# Patient Record
Sex: MALE | Race: WHITE | NOT HISPANIC OR LATINO | Employment: OTHER | URBAN - METROPOLITAN AREA
[De-identification: names, ages, dates, MRNs, and addresses within clinical notes are randomized per-mention and may not be internally consistent; named-entity substitution may affect disease eponyms.]

---

## 2017-08-21 ENCOUNTER — HOSPITAL ENCOUNTER (OUTPATIENT)
Dept: LAB | Facility: HOSPITAL | Age: 67
Discharge: HOME | End: 2017-08-21
Attending: INTERNAL MEDICINE | Admitting: INTERNAL MEDICINE

## 2017-08-21 LAB — HBV SURFACE AG SERPL QL IA: NONREACTIVE

## 2019-01-01 ENCOUNTER — HOSPITAL ENCOUNTER (EMERGENCY)
Facility: HOSPITAL | Age: 69
Discharge: ANOTHER HOSPITAL | End: 2019-07-06
Admitting: EMERGENCY MEDICINE

## 2019-01-01 ENCOUNTER — DOCUMENTATION (OUTPATIENT)
Dept: CARDIAC REHAB | Facility: HOSPITAL | Age: 69
End: 2019-01-01

## 2019-01-01 ENCOUNTER — APPOINTMENT (OUTPATIENT)
Dept: CT IMAGING | Facility: HOSPITAL | Age: 69
End: 2019-01-01

## 2019-01-01 ENCOUNTER — HOSPITAL ENCOUNTER (OUTPATIENT)
Dept: CARDIOLOGY | Facility: HOSPITAL | Age: 69
Discharge: HOME | End: 2019-03-26
Attending: INTERNAL MEDICINE | Admitting: INTERNAL MEDICINE

## 2019-01-01 ENCOUNTER — APPOINTMENT (OUTPATIENT)
Dept: GENERAL RADIOLOGY | Facility: HOSPITAL | Age: 69
End: 2019-01-01

## 2019-01-01 ENCOUNTER — HOSPITAL ENCOUNTER (OUTPATIENT)
Dept: PREADMISSION TESTING | Facility: HOSPITAL | Age: 69
Discharge: HOME | End: 2019-04-08
Attending: INTERNAL MEDICINE | Admitting: INTERNAL MEDICINE

## 2019-01-01 ENCOUNTER — HOSPITAL ENCOUNTER (OUTPATIENT)
Dept: PREADMISSION TESTING | Facility: HOSPITAL | Age: 69
Discharge: HOME | End: 2019-04-26
Attending: INTERNAL MEDICINE | Admitting: INTERNAL MEDICINE

## 2019-01-01 ENCOUNTER — ANTICOAGULATION VISIT (OUTPATIENT)
Dept: CARDIOLOGY | Facility: CLINIC | Age: 69
End: 2019-01-01

## 2019-01-01 ENCOUNTER — HOSPITAL ENCOUNTER (INPATIENT)
Facility: HOSPITAL | Age: 69
LOS: 1 days | End: 2019-07-06
Attending: INTERNAL MEDICINE | Admitting: INTERNAL MEDICINE

## 2019-01-01 VITALS
HEART RATE: 75 BPM | OXYGEN SATURATION: 98 % | BODY MASS INDEX: 33.58 KG/M2 | RESPIRATION RATE: 18 BRPM | SYSTOLIC BLOOD PRESSURE: 141 MMHG | TEMPERATURE: 98.1 F | DIASTOLIC BLOOD PRESSURE: 79 MMHG | HEIGHT: 71 IN | WEIGHT: 239.86 LBS

## 2019-01-01 VITALS
HEART RATE: 122 BPM | WEIGHT: 239.86 LBS | DIASTOLIC BLOOD PRESSURE: 61 MMHG | OXYGEN SATURATION: 96 % | BODY MASS INDEX: 33.58 KG/M2 | TEMPERATURE: 98.2 F | RESPIRATION RATE: 34 BRPM | HEIGHT: 71 IN | SYSTOLIC BLOOD PRESSURE: 102 MMHG

## 2019-01-01 DIAGNOSIS — N18.6 ESRD (END STAGE RENAL DISEASE) (HCC): ICD-10-CM

## 2019-01-01 DIAGNOSIS — I60.9 SUBARACHNOID HEMORRHAGE (HCC): Primary | ICD-10-CM

## 2019-01-01 DIAGNOSIS — S40.011A CONTUSION OF MULTIPLE SITES OF RIGHT SHOULDER, INITIAL ENCOUNTER: ICD-10-CM

## 2019-01-01 DIAGNOSIS — R55 SYNCOPE, UNSPECIFIED SYNCOPE TYPE: ICD-10-CM

## 2019-01-01 DIAGNOSIS — S80.02XA CONTUSION OF LEFT KNEE, INITIAL ENCOUNTER: ICD-10-CM

## 2019-01-01 DIAGNOSIS — S00.01XA ABRASION OF SCALP, INITIAL ENCOUNTER: ICD-10-CM

## 2019-01-01 DIAGNOSIS — I48.91 ATRIAL FIBRILLATION, UNSPECIFIED TYPE (HCC): Primary | ICD-10-CM

## 2019-01-01 LAB
ABO + RH BLD: NORMAL
ABO GROUP BLD: NORMAL
ALBUMIN SERPL-MCNC: 3.5 G/DL (ref 3.5–5.2)
ALBUMIN SERPL-MCNC: 3.5 G/DL (ref 3.5–5.2)
ALBUMIN/GLOB SERPL: 1.1 G/DL
ALP SERPL-CCNC: 104 U/L (ref 39–117)
ALT SERPL W P-5'-P-CCNC: 10 U/L (ref 1–41)
ANION GAP SERPL CALC-SCNC: 17.6 MMOL/L (ref 10–20)
ANION GAP SERPL CALC-SCNC: 19.6 MMOL/L (ref 10–20)
ANION GAP SERPL CALCULATED.3IONS-SCNC: 18.8 MMOL/L (ref 5–15)
ANION GAP SERPL CALCULATED.3IONS-SCNC: 18.8 MMOL/L (ref 5–15)
ANION GAP SERPL CALCULATED.3IONS-SCNC: 19.5 MMOL/L (ref 10–20)
ANISOCYTOSIS BLD QL: ABNORMAL
ARTERIAL PATENCY WRIST A: POSITIVE
AST SERPL-CCNC: 17 U/L (ref 1–40)
ATMOSPHERIC PRESS: 750.9 MMHG
BASE EXCESS BLDA CALC-SCNC: -7.8 MMOL/L (ref 0–2)
BASOPHILS # BLD AUTO: 0.1 10*3/MM3 (ref 0–0.2)
BASOPHILS # BLD AUTO: 0.1 10*3/UL (ref 0–0.2)
BASOPHILS # BLD AUTO: 0.1 10*3/UL (ref 0–0.2)
BASOPHILS NFR BLD AUTO: 0.9 % (ref 0–1.5)
BASOPHILS NFR BLD AUTO: 1 % (ref 0–2)
BASOPHILS NFR BLD AUTO: 1 % (ref 0–2)
BDY SITE: ABNORMAL
BH BB BLOOD EXPIRATION DATE: NORMAL
BH BB BLOOD TYPE BARCODE: 7300
BH BB DISPENSE STATUS: NORMAL
BH BB PRODUCT CODE: NORMAL
BH BB UNIT NUMBER: NORMAL
BILIRUB SERPL-MCNC: 0.5 MG/DL (ref 0.2–1.2)
BNP SERPL-MCNC: 112 PG/ML
BUN BLD-MCNC: 19 MG/DL (ref 8–20)
BUN BLD-MCNC: 26 MG/DL (ref 8–23)
BUN BLD-MCNC: 26 MG/DL (ref 8–23)
BUN SERPL-MCNC: 14 MG/DL (ref 8–20)
BUN SERPL-MCNC: 8 MG/DL (ref 8–20)
BUN/CREAT SERPL: 1.9 (ref 6.2–20.3)
BUN/CREAT SERPL: 2.7 (ref 6.2–20.3)
BUN/CREAT SERPL: 2.9 (ref 6.2–20.3)
BUN/CREAT SERPL: 4.1 (ref 7–25)
BUN/CREAT SERPL: 4.1 (ref 7–25)
BURR CELLS BLD QL SMEAR: ABNORMAL
CALCIUM SERPL-MCNC: 9.6 MG/DL (ref 8.9–10.3)
CALCIUM SERPL-MCNC: 9.9 MG/DL (ref 8.9–10.3)
CALCIUM SPEC-SCNC: 9.1 MG/DL (ref 8.9–10.3)
CALCIUM SPEC-SCNC: 9.7 MG/DL (ref 8.6–10.5)
CALCIUM SPEC-SCNC: 9.7 MG/DL (ref 8.6–10.5)
CHLORIDE SERPL-SCNC: 103 MMOL/L (ref 98–107)
CHLORIDE SERPL-SCNC: 103 MMOL/L (ref 98–107)
CHLORIDE SERPL-SCNC: 97 MMOL/L (ref 101–111)
CHLORIDE SERPL-SCNC: 98 MMOL/L (ref 101–111)
CHLORIDE SERPL-SCNC: 98 MMOL/L (ref 101–111)
CO2 SERPL-SCNC: 18.2 MMOL/L (ref 22–29)
CO2 SERPL-SCNC: 18.2 MMOL/L (ref 22–29)
CO2 SERPL-SCNC: 21 MMOL/L (ref 22–32)
CONV CO2: 23 MMOL/L (ref 22–32)
CONV CO2: 24 MMOL/L (ref 22–32)
CREAT BLD-MCNC: 6.34 MG/DL (ref 0.76–1.27)
CREAT BLD-MCNC: 6.34 MG/DL (ref 0.76–1.27)
CREAT BLD-MCNC: 6.6 MG/DL (ref 0.7–1.2)
CREAT UR-MCNC: 4.2 MG/DL (ref 0.7–1.2)
CREAT UR-MCNC: 5.1 MG/DL (ref 0.7–1.2)
DEPRECATED RDW RBC AUTO: 56 FL (ref 37–54)
DEPRECATED RDW RBC AUTO: 60.2 FL (ref 37–54)
DIFFERENTIAL METHOD BLD: (no result)
DIFFERENTIAL METHOD BLD: (no result)
EOSINOPHIL # BLD AUTO: 0.2 10*3/MM3 (ref 0–0.4)
EOSINOPHIL # BLD AUTO: 0.3 10*3/UL (ref 0–0.3)
EOSINOPHIL # BLD AUTO: 0.4 10*3/UL (ref 0–0.3)
EOSINOPHIL # BLD AUTO: 3 % (ref 0–3)
EOSINOPHIL # BLD AUTO: 4 % (ref 0–3)
EOSINOPHIL # BLD MANUAL: 0.11 10*3/MM3 (ref 0–0.4)
EOSINOPHIL NFR BLD AUTO: 2.1 % (ref 0.3–6.2)
EOSINOPHIL NFR BLD MANUAL: 1 % (ref 0.3–6.2)
ERYTHROCYTE [DISTWIDTH] IN BLOOD BY AUTOMATED COUNT: 15.3 % (ref 11.5–14.5)
ERYTHROCYTE [DISTWIDTH] IN BLOOD BY AUTOMATED COUNT: 15.6 % (ref 12.3–15.4)
ERYTHROCYTE [DISTWIDTH] IN BLOOD BY AUTOMATED COUNT: 16.4 % (ref 11.5–14.5)
ERYTHROCYTE [DISTWIDTH] IN BLOOD BY AUTOMATED COUNT: 16.6 % (ref 12.3–15.4)
GFR SERPL CREATININE-BSD FRML MDRD: 8 ML/MIN/1.73
GFR SERPL CREATININE-BSD FRML MDRD: 9 ML/MIN/1.73
GFR SERPL CREATININE-BSD FRML MDRD: 9 ML/MIN/1.73
GFR SERPL CREATININE-BSD FRML MDRD: ABNORMAL ML/MIN/1.73
GLOBULIN UR ELPH-MCNC: 3.3 GM/DL
GLUCOSE BLD-MCNC: 146 MG/DL (ref 65–99)
GLUCOSE BLD-MCNC: 146 MG/DL (ref 65–99)
GLUCOSE BLD-MCNC: 151 MG/DL (ref 65–99)
GLUCOSE BLDC GLUCOMTR-MCNC: 110 MG/DL (ref 70–130)
GLUCOSE BLDC GLUCOMTR-MCNC: 112 MG/DL (ref 70–130)
GLUCOSE SERPL-MCNC: 106 MG/DL (ref 65–99)
GLUCOSE SERPL-MCNC: 90 MG/DL (ref 65–99)
HCO3 BLDA-SCNC: 20.3 MMOL/L (ref 22–28)
HCT VFR BLD AUTO: 39.2 % (ref 40–54)
HCT VFR BLD AUTO: 40.2 % (ref 37.5–51)
HCT VFR BLD AUTO: 40.7 % (ref 40–54)
HCT VFR BLD AUTO: 46.1 % (ref 37.5–51)
HGB BLD-MCNC: 12.6 G/DL (ref 14–18)
HGB BLD-MCNC: 12.9 G/DL (ref 13–17.7)
HGB BLD-MCNC: 13.3 G/DL (ref 13–17.7)
HGB BLD-MCNC: 13.3 G/DL (ref 14–18)
HOROWITZ INDEX BLD+IHG-RTO: 100 %
INR PPP: 1 (ref 2–3)
INR PPP: 1.4 (ref 2–3)
INR PPP: 2.31 (ref 2–3)
INR PPP: 3.4 (ref 0.9–1.1)
LYMPHOCYTES # BLD AUTO: 1.5 10*3/UL (ref 0.8–4.8)
LYMPHOCYTES # BLD AUTO: 1.5 10*3/UL (ref 0.8–4.8)
LYMPHOCYTES # BLD AUTO: 1.7 10*3/MM3 (ref 0.7–3.1)
LYMPHOCYTES # BLD MANUAL: 6.05 10*3/MM3 (ref 0.7–3.1)
LYMPHOCYTES NFR BLD AUTO: 14.4 % (ref 19.6–45.3)
LYMPHOCYTES NFR BLD AUTO: 17 % (ref 18–42)
LYMPHOCYTES NFR BLD AUTO: 19 % (ref 18–42)
LYMPHOCYTES NFR BLD MANUAL: 3.1 % (ref 5–12)
LYMPHOCYTES NFR BLD MANUAL: 56.1 % (ref 19.6–45.3)
MACROCYTES BLD QL SMEAR: ABNORMAL
MCH RBC QN AUTO: 30 PG (ref 26.6–33)
MCH RBC QN AUTO: 30.6 PG (ref 26–32)
MCH RBC QN AUTO: 30.9 PG (ref 26.6–33)
MCH RBC QN AUTO: 31.4 PG (ref 26–32)
MCHC RBC AUTO-ENTMCNC: 28.9 G/DL (ref 31.5–35.7)
MCHC RBC AUTO-ENTMCNC: 32.2 G/DL (ref 31.5–35.7)
MCHC RBC AUTO-ENTMCNC: 32.2 G/DL (ref 32–36)
MCHC RBC AUTO-ENTMCNC: 32.6 G/DL (ref 32–36)
MCV RBC AUTO: 104.1 FL (ref 79–97)
MCV RBC AUTO: 95.2 FL (ref 80–94)
MCV RBC AUTO: 96.2 FL (ref 79–97)
MCV RBC AUTO: 96.4 FL (ref 80–94)
METAMYELOCYTES NFR BLD MANUAL: 1 % (ref 0–0)
MODALITY: ABNORMAL
MONOCYTES # BLD AUTO: 0.33 10*3/MM3 (ref 0.1–0.9)
MONOCYTES # BLD AUTO: 0.7 10*3/UL (ref 0.1–1.3)
MONOCYTES # BLD AUTO: 0.8 10*3/UL (ref 0.1–1.3)
MONOCYTES # BLD AUTO: 1 10*3/MM3 (ref 0.1–0.9)
MONOCYTES NFR BLD AUTO: 10 % (ref 2–11)
MONOCYTES NFR BLD AUTO: 8.5 % (ref 5–12)
MONOCYTES NFR BLD AUTO: 9 % (ref 2–11)
NEUTROPHILS # BLD AUTO: 4.18 10*3/MM3 (ref 1.7–7)
NEUTROPHILS # BLD AUTO: 5.2 10*3/UL (ref 2.3–8.6)
NEUTROPHILS # BLD AUTO: 6.1 10*3/UL (ref 2.3–8.6)
NEUTROPHILS # BLD AUTO: 8.7 10*3/MM3 (ref 1.7–7)
NEUTROPHILS NFR BLD AUTO: 67 % (ref 50–75)
NEUTROPHILS NFR BLD AUTO: 69 % (ref 50–75)
NEUTROPHILS NFR BLD AUTO: 74.1 % (ref 42.7–76)
NEUTROPHILS NFR BLD MANUAL: 38.8 % (ref 42.7–76)
NRBC BLD AUTO-RTO: 0 /100 WBC (ref 0–0.2)
NRBC BLD AUTO-RTO: 0 /100{WBCS}
NRBC BLD AUTO-RTO: 0 /100{WBCS}
NRBC SPEC MANUAL: 2 /100 WBC (ref 0–0.2)
NRBC/RBC NFR BLD MANUAL: 0 10*3/UL
NRBC/RBC NFR BLD MANUAL: 0 10*3/UL
O2 A-A PPRESDIFF RESPIRATORY: 0.3 MMHG
PCO2 BLDA: 49.9 MM HG (ref 35–45)
PEEP RESPIRATORY: 5 CM[H2O]
PH BLDA: 7.22 PH UNITS (ref 7.35–7.45)
PHOSPHATE SERPL-MCNC: 3.8 MG/DL (ref 2.5–4.5)
PLATELET # BLD AUTO: 102 10*3/MM3 (ref 140–450)
PLATELET # BLD AUTO: 119 10*3/MM3 (ref 140–450)
PLATELET # BLD AUTO: 122 10*3/UL (ref 150–450)
PLATELET # BLD AUTO: 127 10*3/UL (ref 150–450)
PMV BLD AUTO: 10.5 FL (ref 6–12)
PMV BLD AUTO: 8.4 FL (ref 7.4–10.4)
PMV BLD AUTO: 8.8 FL (ref 6–12)
PMV BLD AUTO: 8.8 FL (ref 7.4–10.4)
PO2 BLDA: 217.8 MM HG (ref 80–100)
POIKILOCYTOSIS BLD QL SMEAR: ABNORMAL
POLYCHROMASIA BLD QL SMEAR: ABNORMAL
POTASSIUM BLD-SCNC: 4.2 MMOL/L (ref 3.5–5.2)
POTASSIUM BLD-SCNC: 4.2 MMOL/L (ref 3.5–5.2)
POTASSIUM BLD-SCNC: 4.5 MMOL/L (ref 3.6–5.1)
POTASSIUM SERPL-SCNC: 3.6 MMOL/L (ref 3.6–5.1)
POTASSIUM SERPL-SCNC: 3.6 MMOL/L (ref 3.6–5.1)
PROT SERPL-MCNC: 6.8 G/DL (ref 6–8.5)
PROTHROMBIN TIME: 10.3 SEC (ref 19.4–28.5)
PROTHROMBIN TIME: 22.3 SECONDS (ref 19.4–28.5)
PROTHROMBIN TIME: ABNORMAL SEC (ref 19.4–28.5)
RBC # BLD AUTO: 4.12 10*6/UL (ref 4.6–6)
RBC # BLD AUTO: 4.18 10*6/MM3 (ref 4.14–5.8)
RBC # BLD AUTO: 4.22 10*6/UL (ref 4.6–6)
RBC # BLD AUTO: 4.43 10*6/MM3 (ref 4.14–5.8)
RH BLD: POSITIVE
SAO2 % BLDCOA: 99.6 % (ref 92–99)
SET MECH RESP RATE: 16
SMALL PLATELETS BLD QL SMEAR: ABNORMAL
SODIUM BLD-SCNC: 134 MMOL/L (ref 136–144)
SODIUM BLD-SCNC: 140 MMOL/L (ref 136–145)
SODIUM BLD-SCNC: 140 MMOL/L (ref 136–145)
SODIUM SERPL-SCNC: 136 MMOL/L (ref 136–144)
SODIUM SERPL-SCNC: 136 MMOL/L (ref 136–144)
TOTAL RATE: 32 BREATHS/MINUTE
TROPONIN I SERPL-MCNC: <0.03 NG/ML (ref 0–0.03)
TROPONIN T SERPL-MCNC: <0.01 NG/ML (ref 0–0.03)
UNIT  ABO: NORMAL
UNIT  RH: NORMAL
VENTILATOR MODE: ABNORMAL
VT ON VENT VENT: 500 ML
WBC # BLD AUTO: 7.7 10*3/UL (ref 4.5–11.5)
WBC # BLD AUTO: 8.8 10*3/UL (ref 4.5–11.5)
WBC MORPH BLD: NORMAL
WBC NRBC COR # BLD: 10.78 10*3/MM3 (ref 3.4–10.8)
WBC NRBC COR # BLD: 11.7 10*3/MM3 (ref 3.4–10.8)

## 2019-01-01 PROCEDURE — 94002 VENT MGMT INPAT INIT DAY: CPT

## 2019-01-01 PROCEDURE — 93005 ELECTROCARDIOGRAM TRACING: CPT | Performed by: NURSE PRACTITIONER

## 2019-01-01 PROCEDURE — 93005 ELECTROCARDIOGRAM TRACING: CPT | Performed by: INTERNAL MEDICINE

## 2019-01-01 PROCEDURE — 82803 BLOOD GASES ANY COMBINATION: CPT

## 2019-01-01 PROCEDURE — 36416 COLLJ CAPILLARY BLOOD SPEC: CPT | Performed by: INTERNAL MEDICINE

## 2019-01-01 PROCEDURE — 86901 BLOOD TYPING SEROLOGIC RH(D): CPT | Performed by: NURSE PRACTITIONER

## 2019-01-01 PROCEDURE — 94799 UNLISTED PULMONARY SVC/PX: CPT

## 2019-01-01 PROCEDURE — 25010000002 LORAZEPAM PER 2 MG

## 2019-01-01 PROCEDURE — 71045 X-RAY EXAM CHEST 1 VIEW: CPT

## 2019-01-01 PROCEDURE — 25010000002 ATROPINE PER 0.01 MG: Performed by: INTERNAL MEDICINE

## 2019-01-01 PROCEDURE — B548ZZA ULTRASONOGRAPHY OF SUPERIOR VENA CAVA, GUIDANCE: ICD-10-PCS | Performed by: INTERNAL MEDICINE

## 2019-01-01 PROCEDURE — 85025 COMPLETE CBC W/AUTO DIFF WBC: CPT | Performed by: INTERNAL MEDICINE

## 2019-01-01 PROCEDURE — 25010000002 EPINEPHRINE PF 1 MG/10ML SOLUTION PREFILLED SYRINGE: Performed by: INTERNAL MEDICINE

## 2019-01-01 PROCEDURE — 80048 BASIC METABOLIC PNL TOTAL CA: CPT | Performed by: NURSE PRACTITIONER

## 2019-01-01 PROCEDURE — P9017 PLASMA 1 DONOR FRZ W/IN 8 HR: HCPCS

## 2019-01-01 PROCEDURE — 25010000002 MAGNESIUM SULFATE PER 500 MG OF MAGNESIUM: Performed by: INTERNAL MEDICINE

## 2019-01-01 PROCEDURE — 80053 COMPREHEN METABOLIC PANEL: CPT | Performed by: INTERNAL MEDICINE

## 2019-01-01 PROCEDURE — 0BH17EZ INSERTION OF ENDOTRACHEAL AIRWAY INTO TRACHEA, VIA NATURAL OR ARTIFICIAL OPENING: ICD-10-PCS | Performed by: INTERNAL MEDICINE

## 2019-01-01 PROCEDURE — 5A1935Z RESPIRATORY VENTILATION, LESS THAN 24 CONSECUTIVE HOURS: ICD-10-PCS | Performed by: INTERNAL MEDICINE

## 2019-01-01 PROCEDURE — 73030 X-RAY EXAM OF SHOULDER: CPT

## 2019-01-01 PROCEDURE — 85025 COMPLETE CBC W/AUTO DIFF WBC: CPT | Performed by: NURSE PRACTITIONER

## 2019-01-01 PROCEDURE — 25010000002 PHENYLEPHRINE 10 MG/ML SOLUTION 5 ML VIAL: Performed by: INTERNAL MEDICINE

## 2019-01-01 PROCEDURE — 84484 ASSAY OF TROPONIN QUANT: CPT | Performed by: NURSE PRACTITIONER

## 2019-01-01 PROCEDURE — 0W9B30Z DRAINAGE OF LEFT PLEURAL CAVITY WITH DRAINAGE DEVICE, PERCUTANEOUS APPROACH: ICD-10-PCS | Performed by: INTERNAL MEDICINE

## 2019-01-01 PROCEDURE — 85610 PROTHROMBIN TIME: CPT | Performed by: INTERNAL MEDICINE

## 2019-01-01 PROCEDURE — 86927 PLASMA FRESH FROZEN: CPT

## 2019-01-01 PROCEDURE — 99285 EMERGENCY DEPT VISIT HI MDM: CPT

## 2019-01-01 PROCEDURE — 93010 ELECTROCARDIOGRAM REPORT: CPT | Performed by: INTERNAL MEDICINE

## 2019-01-01 PROCEDURE — 72125 CT NECK SPINE W/O DYE: CPT

## 2019-01-01 PROCEDURE — 36430 TRANSFUSION BLD/BLD COMPNT: CPT

## 2019-01-01 PROCEDURE — 84484 ASSAY OF TROPONIN QUANT: CPT | Performed by: INTERNAL MEDICINE

## 2019-01-01 PROCEDURE — 85007 BL SMEAR W/DIFF WBC COUNT: CPT | Performed by: INTERNAL MEDICINE

## 2019-01-01 PROCEDURE — 73560 X-RAY EXAM OF KNEE 1 OR 2: CPT

## 2019-01-01 PROCEDURE — 25010000002 PROPOFOL 10 MG/ML EMULSION

## 2019-01-01 PROCEDURE — 02HV33Z INSERTION OF INFUSION DEVICE INTO SUPERIOR VENA CAVA, PERCUTANEOUS APPROACH: ICD-10-PCS | Performed by: INTERNAL MEDICINE

## 2019-01-01 PROCEDURE — 5A12012 PERFORMANCE OF CARDIAC OUTPUT, SINGLE, MANUAL: ICD-10-PCS | Performed by: INTERNAL MEDICINE

## 2019-01-01 PROCEDURE — 80069 RENAL FUNCTION PANEL: CPT | Performed by: INTERNAL MEDICINE

## 2019-01-01 PROCEDURE — 85610 PROTHROMBIN TIME: CPT | Performed by: NURSE PRACTITIONER

## 2019-01-01 PROCEDURE — 5A2204Z RESTORATION OF CARDIAC RHYTHM, SINGLE: ICD-10-PCS | Performed by: INTERNAL MEDICINE

## 2019-01-01 PROCEDURE — 83880 ASSAY OF NATRIURETIC PEPTIDE: CPT | Performed by: NURSE PRACTITIONER

## 2019-01-01 PROCEDURE — 25010000002 VITAMIN K1 PER 1 MG: Performed by: NURSE PRACTITIONER

## 2019-01-01 PROCEDURE — 92950 HEART/LUNG RESUSCITATION CPR: CPT

## 2019-01-01 PROCEDURE — 82962 GLUCOSE BLOOD TEST: CPT

## 2019-01-01 PROCEDURE — 96365 THER/PROPH/DIAG IV INF INIT: CPT

## 2019-01-01 PROCEDURE — 70450 CT HEAD/BRAIN W/O DYE: CPT

## 2019-01-01 PROCEDURE — 86900 BLOOD TYPING SEROLOGIC ABO: CPT | Performed by: NURSE PRACTITIONER

## 2019-01-01 PROCEDURE — 36600 WITHDRAWAL OF ARTERIAL BLOOD: CPT

## 2019-01-01 RX ORDER — MAGNESIUM SULFATE HEPTAHYDRATE 500 MG/ML
INJECTION, SOLUTION INTRAMUSCULAR; INTRAVENOUS
Status: COMPLETED | OUTPATIENT
Start: 2019-01-01 | End: 2019-01-01

## 2019-01-01 RX ORDER — ATROPINE SULFATE 1 MG/ML
INJECTION, SOLUTION INTRAMUSCULAR; INTRAVENOUS; SUBCUTANEOUS
Status: COMPLETED | OUTPATIENT
Start: 2019-01-01 | End: 2019-01-01

## 2019-01-01 RX ORDER — ALBUTEROL SULFATE 90 UG/1
6 AEROSOL, METERED RESPIRATORY (INHALATION)
Status: DISCONTINUED | OUTPATIENT
Start: 2019-01-01 | End: 2019-01-01

## 2019-01-01 RX ORDER — FENTANYL CITRATE 50 UG/ML
25 INJECTION, SOLUTION INTRAMUSCULAR; INTRAVENOUS
Status: DISCONTINUED | OUTPATIENT
Start: 2019-01-01 | End: 2019-01-01 | Stop reason: HOSPADM

## 2019-01-01 RX ORDER — IPRATROPIUM BROMIDE AND ALBUTEROL SULFATE 2.5; .5 MG/3ML; MG/3ML
3 SOLUTION RESPIRATORY (INHALATION) EVERY 6 HOURS PRN
Status: DISCONTINUED | OUTPATIENT
Start: 2019-01-01 | End: 2019-01-01

## 2019-01-01 RX ORDER — CALCIUM CHLORIDE 100 MG/ML
INJECTION INTRAVENOUS; INTRAVENTRICULAR
Status: COMPLETED | OUTPATIENT
Start: 2019-01-01 | End: 2019-01-01

## 2019-01-01 RX ORDER — PROPOFOL 10 MG/ML
VIAL (ML) INTRAVENOUS
Status: COMPLETED
Start: 2019-01-01 | End: 2019-01-01

## 2019-01-01 RX ORDER — SODIUM CHLORIDE 0.9 % (FLUSH) 0.9 %
3 SYRINGE (ML) INJECTION EVERY 12 HOURS SCHEDULED
Status: DISCONTINUED | OUTPATIENT
Start: 2019-01-01 | End: 2019-01-01 | Stop reason: HOSPADM

## 2019-01-01 RX ORDER — NOREPINEPHRINE BIT/0.9 % NACL 8 MG/250ML
.02-.3 INFUSION BOTTLE (ML) INTRAVENOUS
Status: DISCONTINUED | OUTPATIENT
Start: 2019-01-01 | End: 2019-01-01 | Stop reason: HOSPADM

## 2019-01-01 RX ORDER — LORAZEPAM 2 MG/ML
INJECTION INTRAMUSCULAR
Status: COMPLETED
Start: 2019-01-01 | End: 2019-01-01

## 2019-01-01 RX ORDER — SODIUM BICARBONATE 650 MG/1
650 TABLET ORAL DAILY
Status: DISCONTINUED | OUTPATIENT
Start: 2019-01-01 | End: 2019-01-01 | Stop reason: HOSPADM

## 2019-01-01 RX ORDER — SODIUM CHLORIDE 0.9 % (FLUSH) 0.9 %
3-10 SYRINGE (ML) INJECTION AS NEEDED
Status: DISCONTINUED | OUTPATIENT
Start: 2019-01-01 | End: 2019-01-01 | Stop reason: HOSPADM

## 2019-01-01 RX ORDER — ALLOPURINOL 100 MG/1
100 TABLET ORAL DAILY
Status: DISCONTINUED | OUTPATIENT
Start: 2019-01-01 | End: 2019-01-01 | Stop reason: HOSPADM

## 2019-01-01 RX ORDER — LEVETIRACETAM 5 MG/ML
500 INJECTION INTRAVASCULAR EVERY 24 HOURS
Status: DISCONTINUED | OUTPATIENT
Start: 2019-01-01 | End: 2019-01-01 | Stop reason: HOSPADM

## 2019-01-01 RX ORDER — PANTOPRAZOLE SODIUM 40 MG/1
40 TABLET, DELAYED RELEASE ORAL EVERY MORNING
Status: DISCONTINUED | OUTPATIENT
Start: 2019-01-01 | End: 2019-01-01 | Stop reason: HOSPADM

## 2019-01-01 RX ORDER — SODIUM CHLORIDE 0.9 % (FLUSH) 0.9 %
10 SYRINGE (ML) INJECTION AS NEEDED
Status: DISCONTINUED | OUTPATIENT
Start: 2019-01-01 | End: 2019-01-01 | Stop reason: HOSPADM

## 2019-01-01 RX ORDER — ATORVASTATIN CALCIUM 20 MG/1
20 TABLET, FILM COATED ORAL DAILY
Status: DISCONTINUED | OUTPATIENT
Start: 2019-01-01 | End: 2019-01-01 | Stop reason: HOSPADM

## 2019-01-01 RX ORDER — NOREPINEPHRINE BIT/0.9 % NACL 8 MG/250ML
INFUSION BOTTLE (ML) INTRAVENOUS
Status: COMPLETED
Start: 2019-01-01 | End: 2019-01-01

## 2019-01-01 RX ADMIN — EPINEPHRINE 1 MG: 0.1 INJECTION, SOLUTION ENDOTRACHEAL; INTRACARDIAC; INTRAVENOUS at 04:51

## 2019-01-01 RX ADMIN — EPINEPHRINE 1 MG: 0.1 INJECTION, SOLUTION ENDOTRACHEAL; INTRACARDIAC; INTRAVENOUS at 05:03

## 2019-01-01 RX ADMIN — SODIUM BICARBONATE 50 MEQ: 84 INJECTION, SOLUTION INTRAVENOUS at 04:45

## 2019-01-01 RX ADMIN — PHYTONADIONE 10 MG: 10 INJECTION, EMULSION INTRAMUSCULAR; INTRAVENOUS; SUBCUTANEOUS at 23:25

## 2019-01-01 RX ADMIN — Medication 0.3 MCG/KG/MIN: at 04:06

## 2019-01-01 RX ADMIN — CALCIUM CHLORIDE 1 G: 100 INJECTION, SOLUTION INTRAVENOUS at 02:57

## 2019-01-01 RX ADMIN — CALCIUM CHLORIDE 1 G: 100 INJECTION, SOLUTION INTRAVENOUS at 04:42

## 2019-01-01 RX ADMIN — EPINEPHRINE 1 MG: 0.1 INJECTION, SOLUTION ENDOTRACHEAL; INTRACARDIAC; INTRAVENOUS at 02:55

## 2019-01-01 RX ADMIN — SODIUM CHLORIDE, PRESERVATIVE FREE 3 ML: 5 INJECTION INTRAVENOUS at 01:30

## 2019-01-01 RX ADMIN — MAGNESIUM SULFATE HEPTAHYDRATE 2 G: 500 INJECTION, SOLUTION INTRAMUSCULAR; INTRAVENOUS at 04:57

## 2019-01-01 RX ADMIN — EPINEPHRINE 1 MG: 0.1 INJECTION, SOLUTION ENDOTRACHEAL; INTRACARDIAC; INTRAVENOUS at 04:55

## 2019-01-01 RX ADMIN — EPINEPHRINE 1 MG: 0.1 INJECTION, SOLUTION ENDOTRACHEAL; INTRACARDIAC; INTRAVENOUS at 05:06

## 2019-01-01 RX ADMIN — EPINEPHRINE 1 MG: 0.1 INJECTION, SOLUTION ENDOTRACHEAL; INTRACARDIAC; INTRAVENOUS at 03:01

## 2019-01-01 RX ADMIN — PROPOFOL 20 MCG/KG/MIN: 10 INJECTION, EMULSION INTRAVENOUS at 04:04

## 2019-01-01 RX ADMIN — SODIUM BICARBONATE 50 MEQ: 84 INJECTION, SOLUTION INTRAVENOUS at 02:56

## 2019-01-01 RX ADMIN — EPINEPHRINE 1 MG: 0.1 INJECTION, SOLUTION ENDOTRACHEAL; INTRACARDIAC; INTRAVENOUS at 04:47

## 2019-01-01 RX ADMIN — EPINEPHRINE 1 MG: 0.1 INJECTION, SOLUTION ENDOTRACHEAL; INTRACARDIAC; INTRAVENOUS at 04:57

## 2019-01-01 RX ADMIN — EPINEPHRINE 1 MG: 0.1 INJECTION, SOLUTION ENDOTRACHEAL; INTRACARDIAC; INTRAVENOUS at 04:44

## 2019-01-01 RX ADMIN — EPINEPHRINE 1 MG: 0.1 INJECTION, SOLUTION ENDOTRACHEAL; INTRACARDIAC; INTRAVENOUS at 04:40

## 2019-01-01 RX ADMIN — SODIUM BICARBONATE 50 MEQ: 84 INJECTION, SOLUTION INTRAVENOUS at 04:37

## 2019-01-01 RX ADMIN — EPINEPHRINE 1 MG: 0.1 INJECTION, SOLUTION ENDOTRACHEAL; INTRACARDIAC; INTRAVENOUS at 05:00

## 2019-01-01 RX ADMIN — EPINEPHRINE 1 MG: 0.1 INJECTION, SOLUTION ENDOTRACHEAL; INTRACARDIAC; INTRAVENOUS at 02:58

## 2019-01-01 RX ADMIN — ATROPINE SULFATE 1 MG: 1 INJECTION, SOLUTION INTRAMUSCULAR; INTRAVENOUS; SUBCUTANEOUS at 04:35

## 2019-01-01 RX ADMIN — LORAZEPAM 1 MG: 2 INJECTION INTRAMUSCULAR; INTRAVENOUS at 03:21

## 2019-01-01 RX ADMIN — SODIUM BICARBONATE 50 MEQ: 84 INJECTION, SOLUTION INTRAVENOUS at 04:42

## 2019-01-01 RX ADMIN — PHENYLEPHRINE HYDROCHLORIDE 0.5 MCG/KG/MIN: 10 INJECTION INTRAVENOUS at 04:11

## 2019-06-25 NOTE — PROGRESS NOTES
06/25/2019- called pt and he sts that he is not interested in CR and is doing well and walking at home.

## 2019-06-27 NOTE — PATIENT INSTRUCTIONS
Decrease Coumadin weekly regimen to above dosing schedule.  Pt states no changes in diet, meds, alcohol intake or health status.

## 2019-07-06 PROBLEM — I60.9 SAH (SUBARACHNOID HEMORRHAGE) (HCC): Status: ACTIVE | Noted: 2019-01-01

## 2019-07-06 NOTE — NURSING NOTE
"At patient's bedside, when pt stated that he felt clammy and \"weird\". Upon examination, HR went sinus tach and pt diaphoretic. Pt is diabetic, so I went to get the acucheck machine and when I returned to the room pt was non-responsive, all muscles clinched, pupils non-reactive, stridor like breathing. I then called for assistance and pt stopped breathing, became cyanotic. Started bagging and code called. Please see code documentation.    Second code called at 0435, see documentation.    Pt  at 0508, per family request to cease resuscitation.  "

## 2019-07-06 NOTE — CODE DOCUMENTATION
Called stat to bedside as patient was acutely unresponsive.  Turned cyanotic.  Patient was quickly bagged with lower dentures removed quickly.  Color improved.  Patient had a PEA arrest.  See code documentation.  CPR quickly performed.  Epinephrine given calcium given bicarb given.  Obtained rosc    Patient intubated see separate intubation note    Updated family at bedside    Ángel Conner MD  [facility]  07/06/19  3:47 AM

## 2019-07-06 NOTE — PROCEDURES
Central Venous Catheter Insertion Procedure Note      Indications:  vascular access, need for frequent blood draws, shock    Procedure Details   Emergency procedure done directly after cardiopulmonary arrest.     Maximum sterile technique was used including usual patient drapes, antiseptics and physician sterile garments.    Under sterile conditions the skin above the on the right internal jugular vein was prepped with Chlorhexidine and covered with a sterile drape.  An ultrasound was used to locate the vein.  Local anesthesia was applied to the skin and subcutaneous tissues with lidocaine 1%. An 18-gauge needle was then inserted into the vein with ultrasound guidance. A guide wire was then passed easily through the catheter.  The ultrasound was used to confirm placement in the lumen of the vessel. A quad lumen was then inserted into the vessel over the guide wire.  The guide wire was then removed. The catheter was sutured into place and dressed following sterile protocol with Biopatch placed.    Findings:  There were no changes to vital signs. All catheter ports were noted to have appropriate return and were flushed with saline. Patient did tolerate procedure well.    Recommendations:  CXR ordered to verify placement.    Ángel Conner MD  7/6/2019  350am

## 2019-07-06 NOTE — PROCEDURES
Endotracheal Intubation Procedure Note      Indication for endotracheal intubation: airway compromise and respiratory failure.  Sedation: none.  Equipment: A video GlideScope was used and Claudia 4 laryngoscope blade and 7.5mm cuffed endotracheal tube.  Number of attempts: 1.  ETT location confirmed by auscultation, by CXR and ETCO2 monitor.    Ángel Conner MD  7/6/2019  348

## 2019-07-06 NOTE — PROGRESS NOTES
"LPC followup:    S: Called stat to bedside for acute loc, cyanosis.  Pt pulseless, pea, CPR, emergency mediations pushed see separate documentation.  After return of circulation I updated daughter who was present during code.  Questions answered as best as able.  Plan for central line discussed with patient mechanical ventilation and additional investigations.    Objective  /61   Pulse (!) 122   Temp 98.2 °F (36.8 °C) (Oral)   Resp (!) 34   Ht 180.3 cm (70.98\")   Wt 109 kg (239 lb 13.8 oz)   SpO2 96%   BMI 33.47 kg/m²   Vent Settings       Vt (Set, L): 0.5 L  Resp Rate (Set): 16  Pressure Support (cm H2O): 0 cm H20  FiO2 (%): 100 %  PEEP/CPAP (cm H2O): 5 cm H20    Minute Ventilation (L/min) (Obs): 16.5 L/min  Resp Rate (Observed) Vent: 31     I:E Ratio (Obs): 1:1.60    PIP Observed (cm H2O): 36 cm H2O     Patient is ill-appearing on mechanical ventilation  Eyes have roaming deviation neck moving back and forth rhythmically  Symmetric air entry bilaterally no crackles no wheeze  Heart rate tachycardic no murmur no rubs  Abdomen soft nontender bowel sounds present no HSM  Extremities no cyanosis peripheral pulses intact good pulse bilaterally left upper extremity hemodialysis access  Neuro rhythmic movements noted above not responsive at present    Results from last 7 days   Lab Units 07/06/19  0313 07/05/19  2021   WBC 10*3/mm3 10.78 11.70*   HEMOGLOBIN g/dL 13.3 12.9*   PLATELETS 10*3/mm3 102* 119*     Results from last 7 days   Lab Units 07/06/19  0313 07/05/19 2021   SODIUM mmol/L 140  140 134*   POTASSIUM mmol/L 4.2  4.2 4.5   CHLORIDE mmol/L 103  103 98*   CO2 mmol/L 18.2*  18.2* 21.0*   BUN mg/dL 26*  26* 19   CREATININE mg/dL 6.34*  6.34* 6.60*   GLUCOSE mg/dL 146*  146* 151*   CALCIUM mg/dL 9.7  9.7 9.1   PHOSPHORUS mg/dL 3.8  --    Estimated Creatinine Clearance: 13.3 mL/min (A) (by C-G formula based on SCr of 6.6 mg/dL (H)).      cxr reviewed at bedside ET tube in trachea about 3-4 " above ovidio, no effusions no infiltrates    A/P:  Cardiopulmonary arrest  Subarachnoid hemorrhage  Coagulopathy secondary to Coumadin  Leukocytosis  Thrombocytopenia  ESRD on hemodialysis  Hyperglycemia  Coronary artery disease status post CABG in distant past  Syncopal episode  COPD  HTN  Afib    Patient had sudden onset change in mental status as well as sudden onset hypoxia.  Given concern for intracranial hemorrhage rhythmic movements after resuscitation I suspect he may have suffered from a seizure event with resulting hypoxia.  Other differential would include acute PE or acute coronary syndrome.  However the no VF no VT noted.  EKG unchanged compared to prior upon admission no reports of chest pain upon admission.  Go ahead and repeat CT scan of the head titrate vasopressor medications.  I will go ahead and have phenylephrine be our first line given his tachycardia present.  Go ahead and give Ativan 1 mg as well as low with Keppra provide propofol for antiepileptic and sedative properties.  Fentanyl as needed pain    Keppra 500mg x 1  Stat ABG  Stat CBC  Stat BMP  Stat lactate  Stat ct head non contrast  Chest x-ray to verify line placement  Cards consult  Neurology consultation  Reviewed chest x-ray images personally of ET tube placement  Updated daughters at bedside.  Daughter in room noted the patient head moving to left rhythmically prior to arrest.    Additional critical care time outside of time spent on billable procedures 90 minute so far tonight.    Ángel Conner MD  [facility]  07/06/19  424am

## 2019-07-06 NOTE — ED PROVIDER NOTES
Subjective   Context: 69-year-old patient presents the ER with complaint of head injury, right shoulder pain left knee pain after a syncopal episode at home; patient reports he was sitting on the toilet, he reports that he next remembers waking up after falling forward, he reports he had an injury to the top of his head.  He reports pain to his right shoulder as well as to his left knee.  He reports that prior to this he had no anginal equivalent chest pain tachycardia, no shortness of breath.  The patient reports that he had dialysis today and it was uneventful.  The patient's family reports that he has had recent episodes of syncope, states that the other day that he coughed very hard and briefly passed out.  The patient's daughters states that he is being evaluated for function of his left upper extremity fistula. He reports no tenderness to the left upper extremity, fistula has a positive thrill.      Location: Head  Quality: Burning  Timing: Prior to arrival  Duration: Constant  Aggravating: Position change  Alleviating: None stated    PCP:  Kuldip  Renal:  David  Dialysis:  [facility]            Review of Systems   Constitutional: Negative for activity change, appetite change, fatigue and fever.   HENT: Negative for congestion, nosebleeds, trouble swallowing and voice change.    Eyes: Negative for discharge.   Respiratory: Negative for cough, chest tightness, shortness of breath, wheezing and stridor.    Cardiovascular: Negative for chest pain, palpitations and leg swelling.   Gastrointestinal: Negative for abdominal pain, diarrhea, nausea and vomiting.   Endocrine: Negative for cold intolerance and heat intolerance.   Genitourinary: Negative for flank pain and hematuria.        End stage renal disease with hemodialysis dependency for 2 years   Musculoskeletal: Positive for arthralgias. Negative for back pain, neck pain and neck stiffness.        Right shoulder, left knee   Skin: Positive for wound.        Scalp,  bruising left knee   Neurological: Positive for syncope. Negative for dizziness, tremors, seizures, weakness, light-headedness, numbness and headaches.   Hematological: Bruises/bleeds easily.        Coumadin therapy, Plavix therapy    Consider heparin therapy with dialysis       Past Medical History:   Diagnosis Date   • Atrial fibrillation (CMS/HCC)    • COPD (chronic obstructive pulmonary disease) (CMS/HCC)    • Hypertension    • Renal failure        No Known Allergies    Past Surgical History:   Procedure Laterality Date   • CARDIAC SURGERY     • CARDIAC SURGERY         Family History   Problem Relation Age of Onset   • Stroke Mother    • Heart failure Mother    • Heart failure Father    • Leukemia Father    • Cancer Sister    • Autoimmune disease Sister    • Heart disease Brother        Social History     Socioeconomic History   • Marital status: Single     Spouse name: Not on file   • Number of children: Not on file   • Years of education: Not on file   • Highest education level: Not on file   Tobacco Use   • Smoking status: Current Some Day Smoker     Packs/day: 1.50   • Smokeless tobacco: Never Used   Substance and Sexual Activity   • Alcohol use: No     Frequency: Never   • Drug use: No           Objective   Physical Exam       Vital signs and triage nurse note reviewed.   Constitutional: Awake, alert; well-developed and well-nourished. No acute distress is noted.  Pleasant and conversational on examination   HEENT: Normocephalic, facial abrasion skin tear is noted to the top of the scalp, there is a scant amount of venous bleeding, underlying bony abnormality; no periorbital or mastoid ecchymosis; pupils are PERRL with intact EOM, no hyphema; is patent without epistaxis; oropharynx is pink and moist without exudate or erythema.   Neck: Supple, full range of motion without pain; cervical spine is midline, there is no midline tenderness or step-off fractures paraspinal musculature is soft and non-tender    Cardiovascular: Regular rate and rhythm, normal S1-S2.   Pulmonary: Respiratory effort regular nonlabored, left segment, subcu emphysema, no palpable chest wall tenderness, breath sounds clear to auscultation all fields.   Abdomen: Soft, nontender nondistended with normoactive bowel sounds; no rebound or guarding.   Musculoskeletal: Wrist with palpation of the anterior aspect of the right shoulder, no crepitus is noted there is no bony abnormality, there is also tenderness with palpation to the anterior left knee, small bruising is noted to that area no palpable effusion, range of motion of all extremities and joints are present without palpable tenderness or edema, distal motor sensory vascular status is intact.  Fistula is intact with intact dressing no active bleeding positive thrill, the patient has no palpable tenderness.  Pelvic has no deformity, no palpable tenderness.  Thoracic and lumbar spine are midline  Tenderness paraspinal musculature soft and nontender.  Independent range of motion of all extremities with no palpable tenderness or edema.   Neuro: Alert oriented x3, speech is clear and appropriate, GCS 15   Skin:  Fleshtone warm, dry, intact kept as described above; no erythematous or petechial rash or lesion    Procedures       ECG 12 Lead   Preliminary Result   HEART RATE= 86  bpm   RR Interval= 700  ms   OR Interval= 168  ms   P Horizontal Axis= -23  deg   P Front Axis= 70  deg   QRSD Interval= 93  ms   QT Interval= 356  ms   QRS Axis= 58  deg   T Wave Axis= 73  deg   - BORDERLINE ECG -   Sinus rhythm   Probable left atrial enlargement   Electronically Signed By:    Date and Time of Study: 2019-07-05 20:40:37        Viewed by me, viewed and interpreted by Dr Gates: The above, compared to prior EKG there is change from todays sinus rhythm to prior reading of atrial fibrillation    ED Course        Xr Shoulder 2+ View Right    Result Date: 7/5/2019  No acute fracture or dislocation.  Electronically  Signed By-Kevon Rod On:7/5/2019 8:53 PM This report was finalized on [id number] by  Kevon Rod, .    Xr Knee 1 Or 2 View Left    Result Date: 7/5/2019  No acute fracture or dislocation.  Electronically Signed By-Kevon Rod On:7/5/2019 8:54 PM This report was finalized on [id number] by  Kevon Rod, .    Ct Head Without Contrast    Result Date: 7/5/2019   1. There are findings suspicious for a small subarachnoid hemorrhage in the right parafalcine location. The results were discussed with AYUSH Medina by telephone. 2. Minor age appropriate cerebral atrophy.  Electronically Signed By-Kevon Rod On:7/5/2019 10:33 PM This report was finalized on [id number] by  Kevon Rod, .    Ct Cervical Spine Without Contrast    Result Date: 7/5/2019  1. No acute fracture or dislocation. 2. Multilevel degenerative changes causing varying degrees of central canal stenosis and neural foraminal narrowing with nerve root impingement.  Electronically Signed By-Kevon Rod On:7/5/2019 10:41 PM This report was finalized on [id number] by  Kevon Rod, .    Xr Chest 1 View    Result Date: 7/6/2019   1. Endotracheal tube could be advanced about 2.5 cm for more optimal positioning. 2. Left basilar pneumothorax.  This report was finalized on 7/6/2019 3:32 AM by Dr. Lori Henning M.D.      Xr Chest Post Cva Port    Result Date: 7/6/2019   1. Right internal jugular vein catheter appears to terminate within the superior vena cava. No definite pneumothorax is seen on the right, although subcutaneous seen has increased. Left-sided pneumothorax has also increased.  This report was finalized on 7/6/2019 4:10 AM by Dr. Lori Henning M.D.      Results for orders placed or performed during the hospital encounter of 07/05/19   Protime-INR   Result Value Ref Range    Protime 22.3 19.4 - 28.5 Seconds    INR 2.31 2.00 - 3.00   Basic Metabolic Panel   Result Value Ref Range    Glucose 151 (H) 65 - 99 mg/dL    BUN 19 8 - 20 mg/dL     "Creatinine 6.60 (H) 0.70 - 1.20 mg/dL    Sodium 134 (L) 136 - 144 mmol/L    Potassium 4.5 3.6 - 5.1 mmol/L    Chloride 98 (L) 101 - 111 mmol/L    CO2 21.0 (L) 22.0 - 32.0 mmol/L    Calcium 9.1 8.9 - 10.3 mg/dL    eGFR  African Amer  >60 mL/min/1.73    eGFR Non African Amer 8 (L) >60 mL/min/1.73    BUN/Creatinine Ratio 2.9 (L) 6.2 - 20.3    Anion Gap 19.5 10.0 - 20.0 mmol/L   Troponin   Result Value Ref Range    Troponin I <0.030 0.000 - 0.030 ng/mL   BNP   Result Value Ref Range    .0 (H) <=100.0 pg/mL   CBC Auto Differential   Result Value Ref Range    WBC 11.70 (H) 3.40 - 10.80 10*3/mm3    RBC 4.18 4.14 - 5.80 10*6/mm3    Hemoglobin 12.9 (L) 13.0 - 17.7 g/dL    Hematocrit 40.2 37.5 - 51.0 %    MCV 96.2 79.0 - 97.0 fL    MCH 30.9 26.6 - 33.0 pg    MCHC 32.2 31.5 - 35.7 g/dL    RDW 16.6 (H) 12.3 - 15.4 %    RDW-SD 56.0 (H) 37.0 - 54.0 fl    MPV 8.8 6.0 - 12.0 fL    Platelets 119 (L) 140 - 450 10*3/mm3    Neutrophil % 74.1 42.7 - 76.0 %    Lymphocyte % 14.4 (L) 19.6 - 45.3 %    Monocyte % 8.5 5.0 - 12.0 %    Eosinophil % 2.1 0.3 - 6.2 %    Basophil % 0.9 0.0 - 1.5 %    Neutrophils, Absolute 8.70 (H) 1.70 - 7.00 10*3/mm3    Lymphocytes, Absolute 1.70 0.70 - 3.10 10*3/mm3    Monocytes, Absolute 1.00 (H) 0.10 - 0.90 10*3/mm3    Eosinophils, Absolute 0.20 0.00 - 0.40 10*3/mm3    Basophils, Absolute 0.10 0.00 - 0.20 10*3/mm3    nRBC 0.0 0.0 - 0.2 /100 WBC   Type & Screen   Result Value Ref Range    ABO Type O     RH type Positive      Medications   phytonadione (AQUA-MEPHYTON, VITAMIN K) 10 mg in sodium chloride 0.9 % 50 mL IVPB (0 mg Intravenous Stopped 7/6/19 0009)     /79 (BP Location: Right arm, Patient Position: Sitting)   Pulse 75   Temp 98.1 °F (36.7 °C) (Oral)   Resp 18   Ht 180.3 cm (71\")   Wt 109 kg (239 lb 13.8 oz)   SpO2 98%   BMI 33.45 kg/m²             MDM  Comorbidities:   Retention, ER ST on hemodialysis, CAD, A. fib    Previous records reviewed:    Review and summarization of lab " specimens, radiology:   Viewed by me    Consultation: Neurosurgery:  Mounika PATTEN Intensivist:  Rula accepts for admission    Differentials: MI, NSTEMI, syncope, ICH, mass,  Imbalance, dehydration, arrhythmia, contusion, fracture; this list is not all inclusive and does not constitute the entirety of considered causes      Reviewed with patient and family agree with recommendation for consultation; patient was discussed with neurosurgery on-call accept the patient for admission to the hospital for repeat evaluation and imaging, the patient will be transferred to [facility] ICU; patient was discussed with intensivist who accepts for admission.  The patient had INR reversal with IV vitamin K, FFP, he remained stable in the ED.  Wound care performed.  Patient was transferred to [facility] ICU in stable condition per ground EMS, no further focal neuro deficits noted.      Final diagnoses:   Subarachnoid hemorrhage (CMS/[facility])   Abrasion of scalp, initial encounter   Contusion of multiple sites of right shoulder, initial encounter   Contusion of left knee, initial encounter   Syncope, unspecified syncope type   ESRD (end stage renal disease) (CMS/[facility])            Ester Kovacs NP  07/06/19 0735       Ester Kovacs NP  07/06/19 0802

## 2019-07-06 NOTE — DISCHARGE SUMMARY
[facility]  Discharge Summary    Date of Admission: 7/6/2019  Date of Death:  7/6/2019      PCP: Phylicia Christiansen APRN    Principle Cause of Death:   Cardiopulmonary arrest    Secondary Diagnoses:     SAH (subarachnoid hemorrhage) (CMS/[facility])  Hypoxic respiratory failure  Metabolic acidosis  ESRD on HD  CAD  Tobacco abuse    Hospital Course  Patient is a 69-year-old male with a previous medical history of CAD status post CABG COPD tobacco abuse ESRD on hemodialysis who presented to the emergency room at [facility] after the patient lost consciousness while having a bowel movement.  He had his dialysis session that morning.  He had no predisposing chest pain illness and emesis fever chills seizure-like activity.  He says he has had syncopal episodes like this previously.  No cough no sputum production no shortness of breath no chest pain at present.  Mild headache no neurological deficit.  At present he is awake conversant can relate previous medical history denies any shortness of breath or chest pain.     Upon evaluation at [facility] the patient was found to have a very small potential subarachnoid hemorrhage.  The patient INR was 2.3 the patient was given vitamin K.  ER report said that FFP was going to be ordered as well.  I see no documentation of this.  Patient denies ever having received it.  Nothing mentioned in nursing report about this either.  Neurosurgery Dr. Ribera was consulted by [facility] and he requested patient be transferred to the ICU and that I admit.  I discussed the case with the ER provider as well as Dr. Ribera.    About 3 hours into his ICU stay patient's daughter reported patient's head turning to the left and outward patient became cyanotic.  Patient was emergently bagged and pulseless.  ACLS was followed intubation post ROSC.  Left-sided pneumothorax about one third or less noted on post arrest chest x-ray.  Right sided central line placed in good position  without right pneumothorax.  Follow-up chest x-ray showed slight worsening of left pneumothorax.  Patient oxygenation good on ABG.  ABG showing slight metabolic and respiratory alkalosis.  Bicarb ordered.    Despite vasopressor medications patient became bradycardic.   Atropine pushed, bicarb pushed. Patient subsequently lost pulse and CPR and epinephrine, bicarb and calcium were pushed.  ACLS was performed. ECG reviewed. Small pneumothorax noted on cxr, on exam good breath sounds bilaterally.  Emergency needle thoracotomy without improvement.  Patient went between pea asystole and coarse vfib.  Shocks delivered.  No success.    Discussed with three daughters at bedside after 25minutes of the second round of emergency resuscitation efforts and they agreed/wished to stop resuscitation efforts.  No pulse in carotid, radial, or fem doppler.  Asystole on monitor.       Ángel Conner MD  07/06/19  5:20 AM

## 2019-07-06 NOTE — H&P
"  .     Admission Note    Patient Identification:  Rashid Cooper  69 y.o.  male  1950  [id number]            CC:  \"Fell at home while on the pot\" - syncope with sah    History of Present Illness:  Patient is a 69-year-old male with a previous medical history of CAD status post CABG COPD tobacco abuse ESRD on hemodialysis who presented to the emergency room at [facility] after the patient lost consciousness while having a bowel movement.  He had his dialysis session that morning.  He had no predisposing chest pain illness and emesis fever chills seizure-like activity.  He says he has had syncopal episodes like this previously.  No cough no sputum production no shortness of breath no chest pain at present.  Mild headache no neurological deficit.  At present he is awake conversant can relate previous medical history denies any shortness of breath or chest pain.    Upon evaluation at [facility] the patient was found to have a very small potential subarachnoid hemorrhage.  The patient INR was 2.3 the patient was given vitamin K.  ER report said that FFP was going to be ordered as well.  I see no documentation of this.  Patient denies ever having received it.  Nothing mentioned in nursing report about this either.  Neurosurgery Dr. Ribera was consulted by [facility] and he requested patient be transferred to the ICU and that I admit.  I discussed the case with the ER provider as well as Dr. Ribera.    Past Medical History:   Diagnosis Date   • Atrial fibrillation (CMS/HCC)    • COPD (chronic obstructive pulmonary disease) (CMS/HCC)    • Hypertension    • Renal failure        Past Surgical History:   Procedure Laterality Date   • CARDIAC SURGERY     • CARDIAC SURGERY          Social History     Socioeconomic History   • Marital status: Single     Spouse name: Not on file   • Number of children: Not on file   • Years of education: Not on file   • Highest education level: Not on file   Tobacco Use "   • Smoking status: Current Some Day Smoker     Packs/day: 1.50   • Smokeless tobacco: Never Used   Substance and Sexual Activity   • Alcohol use: No     Frequency: Never   • Drug use: No       Family History   Problem Relation Age of Onset   • Stroke Mother    • Heart failure Mother    • Heart failure Father    • Leukemia Father    • Cancer Sister    • Autoimmune disease Sister    • Heart disease Brother        Medications Prior to Admission   Medication Sig Dispense Refill Last Dose   • allopurinol (ZYLOPRIM) 100 MG tablet    6/24/2019 at Unknown time   • atorvastatin (LIPITOR) 20 MG tablet    6/24/2019 at Unknown time   • calcium acetate (PHOSLO) 667 MG capsule Take 667 mg by mouth 3 (Three) Times a Day.   6/24/2019 at Unknown time   • COLCRYS 0.6 MG tablet    6/24/2019 at Unknown time   • furosemide (LASIX) 40 MG tablet Take 40 mg by mouth.   6/24/2019 at Unknown time   • lidocaine-prilocaine (EMLA) 2.5-2.5 % cream APPLY SMALL AMOUNT TO ACCESS SITE (AVF) 1 TO 2 HOURS BEFORE DIALYSIS. COVER WITH OCCLUSIVE DRESSING (SARAN WRAP)  11 6/24/2019 at Unknown time   • Magnesium Oxide 400 MG capsule MAGNESIUM OXIDE 400 MG CAPS   6/24/2019 at Unknown time   • omeprazole (priLOSEC) 20 MG capsule    6/24/2019 at Unknown time   • simethicone (GAS-X) 80 MG chewable tablet Chew 80 mg.   6/24/2019 at Unknown time   • sodium bicarbonate 650 MG tablet Daily.   6/24/2019 at Unknown time   • umeclidinium-vilanterol (ANORO ELLIPTA) 62.5-25 MCG/INH aerosol powder  inhaler ANORO ELLIPTA 62.5-25 MCG/INH AEPB   6/24/2019 at Unknown time       No Known Allergies    Review of Systems:  CONSTITUTIONAL:  Denies fevers or chills  EYE:  No new vision changes  EAR:  No change in hearing  CARDIAC:  No chest pain  PULMONARY:  No productive cough or shortness of breath  GI:  No diarrhea, hematemesis or hematochezia,  RENAL:  No dysuria or urinary frequency  MUSCULOSKELETAL:  No musculoskeletal complaints  ENDOCRINE:  No heat or cold  intolerance  INTEGUMENTARY: No skin rashes  NEUROLOGICAL:  No dizziness or confusion.  No seizure activity +syncope  PSYCHIATRIC:  No new anxiety or depression  12 system review of systems performed and all else negative    Physical Exam:  Vitals:  afebilre  Hr 90   bp 100/60  rr 18  Pox 98%    Exam:  General appearance: NAD, conversant   Eyes: anicteric sclerae, moist conjunctivae; no lid-lag; PERRLA  HENT: +lac bandaged with slight bursing; oropharynx clear with moist mucous membranes and no mucosal ulcerations; normal hard and soft palate  Neck: Trachea midline; FROM, supple, no thyromegaly or lymphadenopathy  Lungs: CTA, with normal respiratory effort and no intercostal retractions  CV: RRR, no MRGs   Abdomen: Soft, non-tender; no masses or HSM  Extremities: lue hd accecss good thrill No peripheral edema or extremity lymphadenopathy  Skin: Normal temperature, turgor and texture; no rash, ulcers or subcutaneous nodules lac to head bandaged with slight oozing  Psych: Appropriate affect, alert and oriented to person, place and time        Scheduled meds:    allopurinol 100 mg Oral Daily   atorvastatin 20 mg Oral Daily   calcium acetate 667 mg Oral TID   pantoprazole 40 mg Oral QAM   sodium bicarbonate 650 mg Oral Daily   sodium chloride 3 mL Intravenous Q12H   tiotropium 1 capsule Inhalation Daily - RT       Data Review:   I reviewed the patient's medications and new clinical results.  Lab Results   Component Value Date    CALCIUM 9.1 07/05/2019     Results from last 7 days   Lab Units 07/05/19 2021   BNP pg/mL 112.0*   SODIUM mmol/L 134*   POTASSIUM mmol/L 4.5   CHLORIDE mmol/L 98*   CO2 mmol/L 21.0*   BUN mg/dL 19   CREATININE mg/dL 6.60*   GLUCOSE mg/dL 151*   CALCIUM mg/dL 9.1   WBC 10*3/mm3 11.70*   HEMOGLOBIN g/dL 12.9*   PLATELETS 10*3/mm3 119*     Results from last 7 days   Lab Units 07/05/19 2021   TROPONIN I ng/mL <0.030         Estimated Creatinine Clearance: 13.3 mL/min (A) (by C-G formula based on  SCr of 6.6 mg/dL (H)).    IMAGING:  I have reviewed all imaging studies since my last documentation.  Imaging Results (most recent)     None          ASSESSMENT /   PLAN:  SAH - small, consultation to Dr Ribera Neurosurgery, aware of patient and had requested transfer over  Skin Laceration  Coagulopathy - INR 2.31 in setting of Coumadin use  vit K given at [facility], cant see documentation regarding and FFP, none in RN report, none in EMR, none in OSH documentation sent with patient, pt reports only receiving Vit K - will order one unit ffp, repeat INR in am.  Syncope - sounds vasovagal syncope - has history of this, had HD morning of, was having bowel movement with loc, no chest pain no seizure like activity  ESRD pm HD  CAD s/p CABG 19 years ago  COPD on anoro at home, start spiriva nad prn duonebs    Trend trops, ecg reviewd no change form prior  Serial labs  sah evaluation per neurosurger, discussed with Dr Ribera.  Coumadin reversal with ffp  Consult to nephrology  Reviewed restarted home meds outside of coumadin      Ángel Conner MD  [facility]  07/06/19  12:36 AM

## 2019-07-08 NOTE — PROGRESS NOTES
Case Management Discharge Note    Final Note: Patient expiredo n 19. Shirin Sheehankert CSW     Destination      No service has been selected for the patient.      Durable Medical Equipment      No service has been selected for the patient.      Dialysis/Infusion      No service has been selected for the patient.      Home Medical Care      No service has been selected for the patient.      Therapy      No service has been selected for the patient.      Community Resources      No service has been selected for the patient.             Final Discharge Disposition Code: 20 -

## 2022-10-03 NOTE — CODE DOCUMENTATION
CODE BLUE:    Despite vasopressor medications while in ICU patient went into bradycardia.  Atropine pushed, bicarb pushed. Patient subsequently lost pulse and CPR and epinephrine were pushed.  ACLS was performed.  Small pneumothorax noted on cxr, on exam good breath sounds bilaterally.  Emergency needle thoracotomy without improvement.  Patient went between pea asystole and coarse vfib.  Shocks delivered.  No success.      Discussed with three daughters at bedside after 25minutes of efforts and they agreed/wished to stop resuscitation efforts.  No pulse in carotid, radial, or fem doppler.  Asystole on monitor.    Ángel Conner MD  [facility]  07/06/19  5:18 AM     Opioid Counseling: I discussed with the patient the potential side effects of opioids including but not limited to addiction, altered mental status, and depression. I stressed avoiding alcohol, benzodiazepines, muscle relaxants and sleep aids unless specifically okayed by a physician. The patient verbalized understanding of the proper use and possible adverse effects of opioids. All of the patient's questions and concerns were addressed. They were instructed to flush the remaining pills down the toilet if they did not need them for pain.